# Patient Record
Sex: FEMALE | Race: OTHER | Employment: UNEMPLOYED | URBAN - METROPOLITAN AREA
[De-identification: names, ages, dates, MRNs, and addresses within clinical notes are randomized per-mention and may not be internally consistent; named-entity substitution may affect disease eponyms.]

---

## 2017-12-08 ENCOUNTER — OFFICE VISIT (OUTPATIENT)
Dept: FAMILY MEDICINE CLINIC | Age: 22
End: 2017-12-08

## 2017-12-08 VITALS
BODY MASS INDEX: 34.86 KG/M2 | WEIGHT: 230 LBS | SYSTOLIC BLOOD PRESSURE: 137 MMHG | DIASTOLIC BLOOD PRESSURE: 83 MMHG | HEIGHT: 68 IN | OXYGEN SATURATION: 97 % | TEMPERATURE: 98.4 F | HEART RATE: 78 BPM | RESPIRATION RATE: 16 BRPM

## 2017-12-08 DIAGNOSIS — Z23 ENCOUNTER FOR IMMUNIZATION: ICD-10-CM

## 2017-12-08 DIAGNOSIS — Z34.90 PREGNANCY, UNSPECIFIED GESTATIONAL AGE: Primary | ICD-10-CM

## 2017-12-08 DIAGNOSIS — N92.6 MISSED MENSES: ICD-10-CM

## 2017-12-08 DIAGNOSIS — R03.0 ELEVATED BLOOD PRESSURE READING: ICD-10-CM

## 2017-12-08 LAB
HCG URINE, QL. (POC): POSITIVE
VALID INTERNAL CONTROL?: YES

## 2017-12-08 NOTE — MR AVS SNAPSHOT
Visit Information Date & Time Provider Department Dept. Phone Encounter #  
 12/8/2017  2:15 PM Concepcion Vogt MD [facility] [phone] [id number] Follow-up Instructions Return Call in 2 weeks to schedule an appointment in early January. Upcoming Health Maintenance Date Due Hepatitis A Peds Age 1-18 (1 of 2 - Standard Series) 10/6/1998 DTaP/Tdap/Td series (1 - Tdap) 10/6/2004 HPV AGE 9Y-26Y (1 of 3 - Female 3 Dose Series) 10/6/2008 Influenza Age 5 to Adult 8/1/2017 Allergies as of 12/8/2017  Review Complete On: 9/26/2013 By: Lisa Fulton No Known Allergies Current Immunizations  Never Reviewed Name Date Influenza Vaccine (Quad) PF  Incomplete Not reviewed this visit You Were Diagnosed With   
  
 Codes Comments Pregnancy, unspecified gestational age    -  Primary ICD-10-CM: Z34.90 ICD-9-CM: V22.2 Missed menses     ICD-10-CM: N92.6 ICD-9-CM: 626.4 Encounter for immunization     ICD-10-CM: O28 ICD-9-CM: V03.89 Vitals BP Pulse Temp Resp Height(growth percentile) Weight(growth percentile) 137/83 (BP 1 Location: Left arm, BP Patient Position: Sitting) 78 98.4 °F (36.9 °C) (Oral) 16 5' 7.5\" (1.715 m) 230 lb (104.3 kg) LMP SpO2 BMI OB Status Smoking Status 11/06/2017 97% 35.49 kg/m2 Having regular periods Never Smoker Vitals History BMI and BSA Data Body Mass Index Body Surface Area  
 35.49 kg/m 2 2.23 m 2 Preferred Pharmacy Pharmacy Name Phone [id number] [address] [address] [phone] Your Updated Medication List  
  
   
This list is accurate as of: 12/8/17  2:34 PM.  Always use your most recent med list.  
  
  
  
  
 prenatal vit-calcium-iron-fa 27 mg iron- 1 mg Tab Commonly known as:  PRENATAL PLUS with CALCIUM Take 1 Tab by mouth daily. promethazine 25 mg tablet Commonly known as:  PHENERGAN Take 1 Tab by mouth every six (6) hours as needed for Nausea. Prescriptions Sent to Pharmacy Refills  
 prenatal vit-calcium-iron-fa (PRENATAL PLUS WITH CALCIUM) 27 mg iron- 1 mg tab 4 Sig: Take 1 Tab by mouth daily. Class: Normal  
 Pharmacy: [id number] [place], [place] - [address] [address] AT [address] Ph #: [phone] Route: Oral  
  
We Performed the Following AMB POC URINE PREGNANCY TEST, VISUAL COLOR COMPARISON [25437 CPT(R)] INFLUENZA VIRUS VAC QUAD,SPLIT,PRESV FREE SYRINGE IM [id number] CPT(R)] [place] INFLUENZA IMMUNIZATION ADMINISTERED OR PREVIOUSLY RECEIVED [4274F CPT(R)] Follow-up Instructions Return Call in 2 weeks to schedule an appointment in early January. Patient Instructions Nutrition During Pregnancy: Care Instructions Your Care Instructions Healthy eating when you are pregnant is important for you and your baby. It can help you feel well and have a successful pregnancy and delivery. During pregnancy your nutrition needs increase. Even if you have excellent eating habits, your doctor may recommend a multivitamin to make sure you get enough iron and folic acid. Many pregnant women wonder how much weight they should gain. In general, women who were at a healthy weight before they became pregnant should gain between 25 and 35 pounds. Women who were overweight before pregnancy are usually advised to gain 15 to 25 pounds. Women who were underweight before pregnancy are usually advised to gain 28 to 40 pounds. Your doctor will work with you to set a weight goal that is right for you. Gaining a healthy amount of weight helps you have a healthy baby. Follow-up care is a key part of your treatment and safety. Be sure to make and go to all appointments, and call your doctor if you are having problems. It's also a good idea to know your test results and keep a list of the medicines you take. How can you care for yourself at home? · Eat plenty of fruits and vegetables. Include a variety of orange, yellow, and leafy dark-green vegetables every day. · Choose whole-grain bread, cereal, and pasta. Good choices include whole wheat bread, whole wheat pasta, brown rice, and oatmeal. 
· Get 4 or more servings of milk and milk products each day. Good choices include nonfat or low-fat milk, yogurt, and cheese. If you cannot eat milk products, you can get calcium from calcium-fortified products such as orange juice, soy milk, and tofu. Other non-milk sources of calcium include leafy green vegetables, such as broccoli, kale, mustard greens, turnip greens, bok vicky, and brussels sprouts. · If you eat meat, pick lower-fat types. Good choices include lean cuts of meat and chicken or turkey without the skin. · Do not eat shark, swordfish, reymundo mackerel, or tilefish. They have high levels of mercury, which is dangerous to your baby. You can eat up to 12 ounces a week of fish or shellfish that have low mercury levels. Good choices include shrimp, wild salmon, pollock, and catfish. Do not eat more than 6 ounces of tuna each week. · Heat lunch meats (such as turkey, ham, or bologna) to 165°F before you eat them. This reduces your risk of getting sick from a kind of bacteria that can be found in lunch meats. · Do not eat unpasteurized soft cheeses, such as brie, feta, fresh mozzarella, and blue cheese. They have a bacteria that could harm your baby. · Limit caffeine. If you drink coffee or tea, have no more than 1 cup a day. Caffeine is also found in lisa. · Do not drink any alcohol. No amount of alcohol has been found to be safe during pregnancy. · Do not diet or try to lose weight. For example, do not follow a low-carbohydrate diet. If you are overweight at the start of your pregnancy, your doctor will work with you to manage your weight gain. · Tell your doctor about all vitamins and supplements you take. When should you call for help? Watch closely for changes in your health, and be sure to contact your doctor if you have any problems. Where can you learn more? Go to http://jean-claude-rojelio.info/. Enter Y785 in the search box to learn more about \"Nutrition During Pregnancy: Care Instructions. \" Current as of: March 16, 2017 Content Version: 11.4 © 3444-1169 [employer]. Care instructions adapted under license by [employer] (which disclaims liability or warranty for this information). If you have questions about a medical condition or this instruction, always ask your healthcare professional. [place] [address] any warranty or liability for your use of this information. Learning About Prenatal Visits Your Care Instructions Regular prenatal visits are very important during any pregnancy. These quick office visits may seem simple and routine. But they can help you and your baby stay healthy. Your doctor is watching for problems that can only be found by regularly checking you and your baby. The visits also give you and your doctor time to build a good relationship. Many women have prenatal visits every 4 to 6 weeks until week 28 of pregnancy. Then the visits become more frequent. This is often every 2 to 3 weeks through week 36 of pregnancy. In the final month of pregnancy, you likely will see your doctor every week. You may have a different schedule if you have a medical problem or are a teen. At different times in your pregnancy, you will have exams and tests. Some are routine. Others are done only when there is a chance of a problem. Everything healthy you do for your body helps your growing baby. Rest when you need it. Eat well, drink plenty of water, and exercise regularly. What happens during a prenatal visit? · You will have blood pressure checks, along with urine tests.  You also may have blood tests. If you need to go to the bathroom while waiting for the doctor, tell the nurse. He or she will give you a sample cup so your urine can be tested. · You will be weighed and have your belly measured. · Your doctor may listen to your baby's heartbeat with a special stethoscope. · In your second trimester, your doctor will check your blood sugar (glucose tolerance test) for diabetes that can occur during pregnancy. This is gestational diabetes, which can harm your baby. · You will have tests to check for infections that could harm your . These include group B streptococcus and hepatitis B. 
· Your doctor may do ultrasounds to check for problems. This also checks your baby's position. An ultrasound uses sound waves to produce a picture of your baby. · You may have other tests at any time during your pregnancy. · Use your visits to discuss with your doctor any concerns you have. How can you care for yourself at home? · Get plenty of rest. 
· Exercise every day, if your doctor says it is okay. If you have not exercised in the past, start out slowly. Take many short walks each day. · Eat a balanced diet. Make sure your diet includes plenty of beans, peas, and leafy green vegetables. · Drink plenty of fluids, enough so that your urine is light yellow or clear like water. Drink water. Cut down on drinks with caffeine, such as coffee, tea, and cola. If you have kidney, heart, or liver disease and have to limit fluids, talk with your doctor before you increase the amount of fluids you drink. · Avoid tobacco smoke, alcohol and drugs, chemical fumes, paint fumes, and poisons. Do not smoke or use tobacco. If you need help quitting, talk to your doctor about stop-smoking programs and medicines. These can increase your chances of quitting for good. · Review all of your medicines with your doctor. Some of your routine medicines may need to be changed to protect your baby.  Do not stop or start taking any medicines without talking to your doctor first. 
Follow-up care is a key part of your treatment and safety. Be sure to make and go to all appointments, and call your doctor if you are having problems. It's also a good idea to know your test results and keep a list of the medicines you take. Where can you learn more? Go to http://jean-claude-rojelio.info/. Enter J502 in the search box to learn more about \"Learning About Prenatal Visits. \" Current as of: March 16, 2017 Content Version: 11.4 © 6135-5405 [employer]. Care instructions adapted under license by [employer]) (which disclaims liability or warranty for this information). If you have questions about a medical condition or this instruction, always ask your healthcare professional. [address] any warranty or liability for your use of this information. Introducing [facility]! [facility] introduces [employer] patient portal. Now you can access parts of your medical record, email your doctor's office, and request medication refills online. 1. In your internet browser, go to [email or web address] 2. Click on the First Time User? Click Here link in the Sign In box. You will see the New Member Sign Up page. 3. Enter your [employer] Access Code exactly as it appears below. You will not need to use this code after youve completed the sign-up process. If you do not sign up before the expiration date, you must request a new code. · [employer] Access Code: [email or web address] Expires: 3/8/2018  2:34 PM 
 
4. Enter the last four digits of your Social Security Number (xxxx) and Date of Birth (mm/dd/yyyy) as indicated and click Submit. You will be taken to the next sign-up page. 5. Create a [employer] ID. This will be your [employer] login ID and cannot be changed, so think of one that is secure and easy to remember. 6. Create a marshallindex password. You can change your password at any time. 7. Enter your Password Reset Question and Answer. This can be used at a later time if you forget your password. 8. Enter your e-mail address. You will receive e-mail notification when new information is available in [address] 9. Click Sign Up. You can now view and download portions of your medical record. 10. Click the Download Summary menu link to download a portable copy of your medical information. If you have questions, please visit the Frequently Asked Questions section of the marshallindex website. Remember, marshallindex is NOT to be used for urgent needs. For medical emergencies, dial 911. Now available from your iPhone and Android! Please provide this summary of care documentation to your next provider. Your primary care clinician is listed as NONE. If you have any questions after today's visit, please call [phone].

## 2017-12-08 NOTE — PATIENT INSTRUCTIONS
Nutrition During Pregnancy: Care Instructions  Your Care Instructions    Healthy eating when you are pregnant is important for you and your baby. It can help you feel well and have a successful pregnancy and delivery. During pregnancy your nutrition needs increase. Even if you have excellent eating habits, your doctor may recommend a multivitamin to make sure you get enough iron and folic acid. Many pregnant women wonder how much weight they should gain. In general, women who were at a healthy weight before they became pregnant should gain between 25 and 35 pounds. Women who were overweight before pregnancy are usually advised to gain 15 to 25 pounds. Women who were underweight before pregnancy are usually advised to gain 28 to 40 pounds. Your doctor will work with you to set a weight goal that is right for you. Gaining a healthy amount of weight helps you have a healthy baby. Follow-up care is a key part of your treatment and safety. Be sure to make and go to all appointments, and call your doctor if you are having problems. It's also a good idea to know your test results and keep a list of the medicines you take. How can you care for yourself at home? · Eat plenty of fruits and vegetables. Include a variety of orange, yellow, and leafy dark-green vegetables every day. · Choose whole-grain bread, cereal, and pasta. Good choices include whole wheat bread, whole wheat pasta, brown rice, and oatmeal.  · Get 4 or more servings of milk and milk products each day. Good choices include nonfat or low-fat milk, yogurt, and cheese. If you cannot eat milk products, you can get calcium from calcium-fortified products such as orange juice, soy milk, and tofu. Other non-milk sources of calcium include leafy green vegetables, such as broccoli, kale, mustard greens, turnip greens, bok vicky, and brussels sprouts. · If you eat meat, pick lower-fat types.  Good choices include lean cuts of meat and chicken or turkey without the skin. · Do not eat shark, swordfish, reymundo mackerel, or tilefish. They have high levels of mercury, which is dangerous to your baby. You can eat up to 12 ounces a week of fish or shellfish that have low mercury levels. Good choices include shrimp, wild salmon, pollock, and catfish. Do not eat more than 6 ounces of tuna each week. · Heat lunch meats (such as turkey, ham, or bologna) to 165°F before you eat them. This reduces your risk of getting sick from a kind of bacteria that can be found in lunch meats. · Do not eat unpasteurized soft cheeses, such as brie, feta, fresh mozzarella, and blue cheese. They have a bacteria that could harm your baby. · Limit caffeine. If you drink coffee or tea, have no more than 1 cup a day. Caffeine is also found in lisa. · Do not drink any alcohol. No amount of alcohol has been found to be safe during pregnancy. · Do not diet or try to lose weight. For example, do not follow a low-carbohydrate diet. If you are overweight at the start of your pregnancy, your doctor will work with you to manage your weight gain. · Tell your doctor about all vitamins and supplements you take. When should you call for help? Watch closely for changes in your health, and be sure to contact your doctor if you have any problems. Where can you learn more? Go to http://jean-claude-rojelio.info/. Enter Y785 in the search box to learn more about \"Nutrition During Pregnancy: Care Instructions. \"  Current as of: March 16, 2017  Content Version: 11.4  © 5734-1510 [employer]. Care instructions adapted under license by DNs[employer] (which disclaims liability or warranty for this information). If you have questions about a medical condition or this instruction, always ask your healthcare professional. [address] any warranty or liability for your use of this information.          Learning About Prenatal Visits  Your Care Instructions    Regular prenatal visits are very important during any pregnancy. These quick office visits may seem simple and routine. But they can help you and your baby stay healthy. Your doctor is watching for problems that can only be found by regularly checking you and your baby. The visits also give you and your doctor time to build a good relationship. Many women have prenatal visits every 4 to 6 weeks until week 28 of pregnancy. Then the visits become more frequent. This is often every 2 to 3 weeks through week 36 of pregnancy. In the final month of pregnancy, you likely will see your doctor every week. You may have a different schedule if you have a medical problem or are a teen. At different times in your pregnancy, you will have exams and tests. Some are routine. Others are done only when there is a chance of a problem. Everything healthy you do for your body helps your growing baby. Rest when you need it. Eat well, drink plenty of water, and exercise regularly. What happens during a prenatal visit? · You will have blood pressure checks, along with urine tests. You also may have blood tests. If you need to go to the bathroom while waiting for the doctor, tell the nurse. He or she will give you a sample cup so your urine can be tested. · You will be weighed and have your belly measured. · Your doctor may listen to your baby's heartbeat with a special stethoscope. · In your second trimester, your doctor will check your blood sugar (glucose tolerance test) for diabetes that can occur during pregnancy. This is gestational diabetes, which can harm your baby. · You will have tests to check for infections that could harm your . These include group B streptococcus and hepatitis B.  · Your doctor may do ultrasounds to check for problems. This also checks your baby's position. An ultrasound uses sound waves to produce a picture of your baby. · You may have other tests at any time during your pregnancy.   · Use your visits to discuss with your doctor any concerns you have. How can you care for yourself at home? · Get plenty of rest.  · Exercise every day, if your doctor says it is okay. If you have not exercised in the past, start out slowly. Take many short walks each day. · Eat a balanced diet. Make sure your diet includes plenty of beans, peas, and leafy green vegetables. · Drink plenty of fluids, enough so that your urine is light yellow or clear like water. Drink water. Cut down on drinks with caffeine, such as coffee, tea, and cola. If you have kidney, heart, or liver disease and have to limit fluids, talk with your doctor before you increase the amount of fluids you drink. · Avoid tobacco smoke, alcohol and drugs, chemical fumes, paint fumes, and poisons. Do not smoke or use tobacco. If you need help quitting, talk to your doctor about stop-smoking programs and medicines. These can increase your chances of quitting for good. · Review all of your medicines with your doctor. Some of your routine medicines may need to be changed to protect your baby. Do not stop or start taking any medicines without talking to your doctor first.  Follow-up care is a key part of your treatment and safety. Be sure to make and go to all appointments, and call your doctor if you are having problems. It's also a good idea to know your test results and keep a list of the medicines you take. Where can you learn more? Go to http://jean-claude-rojelio.info/. Enter J502 in the search box to learn more about \"Learning About Prenatal Visits. \"  Current as of: March 16, 2017  Content Version: 11.4  © 0895-2139 [employer]. Care instructions adapted under license by [employer] (which disclaims liability or warranty for this information).  If you have questions about a medical condition or this instruction, always ask your healthcare professional. [address] any warranty or liability for your use of this information.

## 2017-12-08 NOTE — PROGRESS NOTES
23year old G1 -- here to confirm pregnancy    Obesity in pregnancy    I reviewed with the resident the medical history and the resident's findings on the physical examination. I discussed with the resident the patient's diagnosis and concur with the plan.
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
## 2018-01-22 ENCOUNTER — ROUTINE PRENATAL (OUTPATIENT)
Dept: FAMILY MEDICINE CLINIC | Age: 23
End: 2018-01-22

## 2018-01-22 VITALS
DIASTOLIC BLOOD PRESSURE: 75 MMHG | SYSTOLIC BLOOD PRESSURE: 121 MMHG | WEIGHT: 244 LBS | OXYGEN SATURATION: 98 % | RESPIRATION RATE: 18 BRPM | HEIGHT: 68 IN | BODY MASS INDEX: 36.98 KG/M2 | HEART RATE: 96 BPM | TEMPERATURE: 98.6 F

## 2018-01-22 DIAGNOSIS — O20.9 VAGINAL BLEEDING IN PREGNANCY, FIRST TRIMESTER: ICD-10-CM

## 2018-01-22 DIAGNOSIS — Z3A.11 11 WEEKS GESTATION OF PREGNANCY: Primary | ICD-10-CM

## 2018-01-22 LAB
BILIRUB UR QL STRIP: NEGATIVE
GLUCOSE UR-MCNC: NEGATIVE MG/DL
KETONES P FAST UR STRIP-MCNC: NEGATIVE MG/DL
PH UR STRIP: 5.5 [PH] (ref 4.6–8)
PROT UR QL STRIP: NORMAL
SP GR UR STRIP: 1.03 (ref 1–1.03)
UA UROBILINOGEN AMB POC: NORMAL (ref 0.2–1)
URINALYSIS CLARITY POC: NORMAL
URINALYSIS COLOR POC: YELLOW
URINE BLOOD POC: NORMAL
URINE LEUKOCYTES POC: NORMAL
URINE NITRITES POC: NEGATIVE

## 2018-01-22 NOTE — PROGRESS NOTES
Chief Complaint   Patient presents with    Vaginal Bleeding     this morning, brown discharge when she wipes     1. Have you been to the ER, urgent care clinic since your last visit? Hospitalized since your last visit? No    2. Have you seen or consulted any other health care providers outside of the [address] [place] [address] since your last visit? Include any pap smears or colon screening.  No
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
## 2018-01-22 NOTE — MR AVS SNAPSHOT
[address] 
[phone] Patient: Annmarie Gonzalez MRN: [id number] :[id number] Visit Information Date & Time Provider Department Dept. Phone Encounter #  
 2018  9:05 AM Candace Bernice Galeazzi, MD [address] [facility] [phone] [id number] Upcoming Health Maintenance Date Due Hepatitis A Peds Age 1-18 (1 of 2 - Standard Series) 10/6/1998 DTaP/Tdap/Td series (1 - Tdap) 10/6/2004 HPV AGE 9Y-26Y (1 of 3 - Female 3 Dose Series) 10/6/2008 Allergies as of 2018  Review Complete On: 2018 By: Chu Flood MD  
 No Known Allergies Current Immunizations  Never Reviewed Name Date Influenza Vaccine (Quad) PF 2017 Not reviewed this visit You Were Diagnosed With   
  
 Codes Comments 11 weeks gestation of pregnancy    -  Primary ICD-10-CM: Z3A.11 
ICD-9-CM: V22.2 Vitals BP Pulse Temp Resp Height(growth percentile) Weight(growth percentile) 121/75 (BP 1 Location: Right arm, BP Patient Position: Sitting) 96 98.6 °F (37 °C) (Oral) 18 5' 7.5\" (1.715 m) 244 lb (110.7 kg) LMP SpO2 BMI OB Status Smoking Status 2017 98% 37.65 kg/m2 Pregnant Never Smoker Vitals History BMI and BSA Data Body Mass Index Body Surface Area  
 37.65 kg/m 2 2.3 m 2 Preferred Pharmacy Pharmacy Name Phone [id number] [place], [place] - [address] [address] [phone] Your Updated Medication List  
  
   
This list is accurate as of: 18  9:41 AM.  Always use your most recent med list.  
  
  
  
  
 prenatal vit-calcium-iron-fa 27 mg iron- 1 mg Tab Commonly known as:  PRENATAL PLUS with CALCIUM Take 1 Tab by mouth daily. promethazine 25 mg tablet Commonly known as:  PHENERGAN Take 1 Tab by mouth every six (6) hours as needed for Nausea. We Performed the Following AMB POC URINALYSIS DIP STICK AUTO W/O MICRO [35752 CPT(R)] Patient Instructions Weeks 10 to 14 of Your Pregnancy: Care Instructions Your Care Instructions By weeks 10 to 15 of your pregnancy, the placenta has formed inside your uterus. It is possible to hear your baby's heartbeat with a special ultrasound device. Your baby's eyes can and do move. The arms and legs can bend. This is a good time to think about testing for birth defects. There are two types of tests: screening and diagnostic. Screening tests show the chance that a baby has a certain birth defect. They can't tell you for sure that your baby has a problem. Diagnostic tests show if a baby has a certain birth defect. It's your choice whether to have these tests. You and your partner can talk to your doctor or midwife about birth defects tests. Follow-up care is a key part of your treatment and safety. Be sure to make and go to all appointments, and call your doctor if you are having problems. It's also a good idea to know your test results and keep a list of the medicines you take. How can you care for yourself at home? Decide about tests · You can have screening tests and diagnostic tests to check for birth defects. The decision to have a test for birth defects is personal. Think about your age, your chance of passing on a family disease, your need to know about any problems, and what you might do after you have the test results. ¨ Triple or quadruple (quad) blood tests. These screening tests can be done between 15 and 20 weeks of pregnancy. They check the amounts of three or four substances in your blood. The doctor looks at these test results, along with your age and other factors, to find out the chance that your baby may have certain problems. ¨ Amniocentesis. This diagnostic test is used to look for chromosomal problems in the baby's cells. It can be done between 15 and 20 weeks of pregnancy, usually around week 16. ¨ Nuchal translucency test. This test uses ultrasound to measure the thickness of the area at the back of the baby's neck. An increase in the thickness can be an early sign of Down syndrome. ¨ Chorionic villus sampling (CVS). This is a test that looks for certain genetic problems with your baby. The same genes that are in your baby are in the placenta. A small piece of the placenta is taken out and tested. This test is done when you are 10 to 13 weeks pregnant. Ease discomfort · Slow down and take naps when you feel tired. · If your emotions swing, talk to someone. Crying, anxiety, and concentration problems are common. · If your gums bleed, try a softer toothbrush. If your gums are puffy and bleed a lot, see your dentist. 
· If you feel dizzy: ¨ Get up slowly after sitting or lying down. ¨ Drink plenty of fluids. ¨ Eat small snacks to keep your blood sugar stable. ¨ Put your head between your legs as though you were tying your shoelaces. ¨ Lie down with your legs higher than your head. Use pillows to prop up your feet. · If you have a headache: 
¨ Lie down. ¨ Ask your partner or a good friend for a neck massage. ¨ Try cool cloths over your forehead or across the back of your neck. ¨ Use acetaminophen (Tylenol) for pain relief. Do not use nonsteroidal anti-inflammatory drugs (NSAIDs), such as ibuprofen (Advil, Motrin) or naproxen (Aleve), unless your doctor says it is okay. · If you have a nosebleed, pinch your nose gently, and hold it for a short while. To prevent nosebleeds, try massaging a small dab of petroleum jelly, such as Vaseline, in your nostrils. · If your nose is stuffed up, try saline (saltwater) nose sprays. Do not use decongestant sprays. Care for your breasts · Wear a bra that gives you good support. · Know that changes in your breasts are normal. 
¨ Your breasts may get larger and more tender. Tenderness usually gets better by 12 weeks. ¨ Your nipples may get darker and larger, and small bumps around your nipples may show more. ¨ The veins in your chest and breasts may show more. · Don't worry about \"toughening'\" your nipples. Breastfeeding will naturally do this. Where can you learn more? Go to http://jean-claude-rojelio.info/. Enter L573 in the search box to learn more about \"Weeks 10 to 14 of Your Pregnancy: Care Instructions. \" Current as of: March 16, 2017 Content Version: 11.4 © 8342-0289 "[employer]". Care instructions adapted under license by [employer] (which disclaims liability or warranty for this information). If you have questions about a medical condition or this instruction, always ask your healthcare professional. [address] any warranty or liability for your use of this information. Introducing [facility]! Gracie Will introduces [employer] patient portal. Now you can access parts of your medical record, email your doctor's office, and request medication refills online. 1. In your internet browser, go to [email or web address] 2. Click on the First Time User? Click Here link in the Sign In box. You will see the New Member Sign Up page. 3. Enter your [employer] Access Code exactly as it appears below. You will not need to use this code after youve completed the sign-up process. If you do not sign up before the expiration date, you must request a new code. · [employer] Access Code: [email or web address] Expires: 3/8/2018  2:34 PM 
 
4. Enter the last four digits of your Social Security Number (xxxx) and Date of Birth (mm/dd/yyyy) as indicated and click Submit. You will be taken to the next sign-up page. 5. Create a [employer] ID. This will be your [employer] login ID and cannot be changed, so think of one that is secure and easy to remember. 6. Create a [employer] password. You can change your password at any time. 7. Enter your Password Reset Question and Answer. This can be used at a later time if you forget your password. 8. Enter your e-mail address. You will receive e-mail notification when new information is available in [address] 9. Click Sign Up. You can now view and download portions of your medical record. 10. Click the Download Summary menu link to download a portable copy of your medical information. If you have questions, please visit the Frequently Asked Questions section of the [employer] website. Remember, [employer] is NOT to be used for urgent needs. For medical emergencies, dial 911. Now available from your iPhone and Android! Please provide this summary of care documentation to your next provider. Your primary care clinician is listed as NONE. If you have any questions after today's visit, please call [phone].

## 2018-01-22 NOTE — PATIENT INSTRUCTIONS
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
## 2020-07-02 ENCOUNTER — VIRTUAL VISIT (OUTPATIENT)
Dept: FAMILY MEDICINE CLINIC | Age: 25
End: 2020-07-02

## 2020-07-02 DIAGNOSIS — G56.01 CARPAL TUNNEL SYNDROME OF RIGHT WRIST: Primary | ICD-10-CM

## 2020-07-02 RX ORDER — ARM BRACE
EACH MISCELLANEOUS
Qty: 1 EACH | Refills: 0 | Status: SHIPPED | OUTPATIENT
Start: 2020-07-02 | End: 2020-09-14

## 2020-07-02 NOTE — PROGRESS NOTES
[address] Medicine Residency Attending Addendum:  Dr. Dionisio Cerna MD,  the patient and I were not physically present during this encounter. The resident and I are concurrently monitoring the patient care through appropriate telecommunication technology. I discussed the findings, assessment and plan with the resident and agree with the resident's findings and plan as documented in the resident's note.       Katy Uriarte MD

## 2020-07-02 NOTE — PROGRESS NOTES
Deepak White  27 y.o. female  1995  [address]  [place] [address]  [id number]   [address]:    Telemedicine Progress Note  Court MD Francia       Encounter Date and Time: July 2, 2020 at 1:20 PM    Consent:  She and/or the health care decision maker is aware that that she may receive a bill for this telephone service, depending on her insurance coverage, and has provided verbal consent to proceed: Yes    No chief complaint on file. History of Present Illness   Deepak White is a 27 y.o. female was evaluated by synchronous (real-time) audio-video technology from home, through the [employer] Patient Portal.    Patient is a 22yo F who presents for pain in right hand pain. Reports having R wrist pain and tingling that has been occurring for one month but worsened in the past week. Pain located in right wrist and radiates down to inner elbow and has associated tingling sensation in right index finger. Works at [employer] and notices that pain is worse when she has her hands flexed and using the computer mouse. Messaging the arm and shaking out the discomfort helps it. She can have 10 or more episodes per day, but it is always worse in the afternoon when she is busier at work. Denies any trauma to the hand. Denies any swelling. Review of Systems   Review of Systems   Constitutional: Negative for chills and fever. HENT: Negative for congestion. Eyes: Negative for redness. Respiratory: Negative for cough, sputum production, shortness of breath and wheezing. Cardiovascular: Negative for chest pain and palpitations. Gastrointestinal: Negative for abdominal pain, constipation, diarrhea, nausea and vomiting. Genitourinary: Negative for dysuria. Musculoskeletal: Negative for joint pain. Skin: Negative for rash. Neurological: Negative for dizziness. Psychiatric/Behavioral: Negative for depression.        Vitals/Objective:     General: alert, cooperative, no distress   Mental  status: mental status: alert, oriented to person, place, and time, normal mood, behavior, speech, dress, motor activity, and thought processes   Resp: resp: normal effort and no respiratory distress   Neuro: neuro: no gross deficits    Skin:      MSK: skin: normal coloration and turgor, no rashes, no suspicious skin lesions noted  no discoloration or lesions of concern on visible areas  Positive Phalen sign on the right    Due to this being a TeleHealth evaluation, many elements of the physical examination are unable to be assessed. Assessment and Plan:   Time-based coding, delete if not needed: I spent at least 15 minutes with this established patient, and >50% of the time was spent counseling and/or coordinating care regarding wrist pain    Assessment/Plan:    1. Carpal tunnel syndrome of right wrist: Patient is a 23yo F with one week of right wrist pain and paresthesias. Given hx of repetitive wrist movement for work and positive phalen test, carpal tunnel syndrome is most likely cause of symptoms. Will give trial of wrist brace/splint nightly and have patient use NSAIDs as needed for pain. Patient will follow up as needed for persistence or worsening of symptoms. Time spent in direct conversation with the patient to include medical condition(s) discussed, assessment and treatment plan:       We discussed the expected course, resolution and complications of the diagnosis(es) in detail. Medication risks, benefits, costs, interactions, and alternatives were discussed as indicated. I advised her to contact the office if her condition worsens, changes or fails to improve as anticipated. She expressed understanding with the diagnosis(es) and plan. Patient understands that this encounter was a temporary measure, and the importance of further follow up and examination was emphasized. Patient verbalized understanding.        Patient informed to follow up: as needed in Clinic. Electronically Signed: Elieser Stout MD     Providers location when delivering service (clinic, hospital, home): Home    CPT Codes 04439-91104 for Established Patients may apply to this Telehealth Visit. POS code: 18. Modifier GT      Pursuant to the emergency declaration under the [address] [place] [address] [facility] authority and the [employer] Act, this Virtual  Visit was conducted, with patient's consent, to reduce the patient's risk of exposure to COVID-19 and provide continuity of care for an established patient. Services were provided through a video synchronous discussion virtually to substitute for in-person clinic visit. History   Patients past medical, surgical and family histories were reviewed and updated. No past medical history on file. Past Surgical History:   Procedure Laterality Date    HX APPENDECTOMY       No family history on file.   Social History     Socioeconomic History    Marital status: SINGLE     Spouse name: Not on file    Number of children: Not on file    Years of education: Not on file    Highest education level: Not on file   Occupational History    Not on file   Social Needs    Financial resource strain: Not on file    Food insecurity     Worry: Not on file     Inability: Not on file    Transportation needs     Medical: Not on file     Non-medical: Not on file   Tobacco Use    Smoking status: Never Smoker    Smokeless tobacco: Never Used   Substance and Sexual Activity    Alcohol use: No    Drug use: No    Sexual activity: Yes     Partners: Male     Comment: boyfriend, not using protection   Lifestyle    Physical activity     Days per week: Not on file     Minutes per session: Not on file    Stress: Not on file   Relationships    Social connections     Talks on phone: Not on file     Gets together: Not on file     Attends Rastafarian service: Not on file     Active member of club or organization: Not on file     Attends meetings of clubs or organizations: Not on file     Relationship status: Not on file    Intimate partner violence     Fear of current or ex partner: Not on file     Emotionally abused: Not on file     Physically abused: Not on file     Forced sexual activity: Not on file   Other Topics Concern    Not on file   Social History Narrative    Not on file     There is no problem list on file for this patient. Current Medications/Allergies   Medications and Allergies reviewed:    Current Outpatient Medications   Medication Sig Dispense Refill    prenatal vit-calcium-iron-fa (PRENATAL PLUS WITH CALCIUM) 27 mg iron- 1 mg tab Take 1 Tab by mouth daily. 90 Tab 4    promethazine (PHENERGAN) 25 mg tablet Take 1 Tab by mouth every six (6) hours as needed for Nausea.  10 Tab 0     No Known Allergies

## 2020-08-11 ENCOUNTER — TELEPHONE (OUTPATIENT)
Dept: FAMILY MEDICINE CLINIC | Age: 25
End: 2020-08-11

## 2020-08-11 NOTE — TELEPHONE ENCOUNTER
----- Message from Anshu Antunez sent at 8/5/2020  8:06 AM EDT -----  Regarding: Dr Rangel Natanael: [phone]  Appointment not available    Caller's first and last name and relationship to patient (if not the patient):      Best contact number:[phone]      Preferred date and time:face to face visit      Scheduled appointment date and time:      Reason for appointment:ear pain      Details to clarify the request:      Anshu Antunez

## 2020-09-20 ENCOUNTER — HOSPITAL ENCOUNTER (OUTPATIENT)
Dept: PREADMISSION TESTING | Age: 25
Discharge: HOME | End: 2020-09-20
Payer: MEDICAID

## 2020-09-20 DIAGNOSIS — C92.50: ICD-10-CM

## 2020-09-20 PROCEDURE — 87635 SARS-COV-2 COVID-19 AMP PRB: CPT

## 2020-09-21 LAB — SARS-COV-2, COV2NT: NOT DETECTED

## 2020-09-23 ENCOUNTER — HOSPITAL ENCOUNTER (OUTPATIENT)
Age: 25
Setting detail: OUTPATIENT SURGERY
Discharge: HOME | End: 2020-09-23
Attending: INTERNAL MEDICINE | Admitting: INTERNAL MEDICINE
Payer: MEDICAID

## 2020-09-23 ENCOUNTER — ANESTHESIA EVENT (OUTPATIENT)
Dept: ENDOSCOPY | Age: 25
End: 2020-09-23
Payer: MEDICAID

## 2020-09-23 ENCOUNTER — ANESTHESIA (OUTPATIENT)
Dept: ENDOSCOPY | Age: 25
End: 2020-09-23
Payer: MEDICAID

## 2020-09-23 VITALS
SYSTOLIC BLOOD PRESSURE: 112 MMHG | WEIGHT: 262 LBS | TEMPERATURE: 97.6 F | RESPIRATION RATE: 18 BRPM | BODY MASS INDEX: 40.43 KG/M2 | HEART RATE: 65 BPM | OXYGEN SATURATION: 99 % | DIASTOLIC BLOOD PRESSURE: 52 MMHG

## 2020-09-23 LAB — HCG UR QL: NEGATIVE

## 2020-09-23 PROCEDURE — 74011250636 HC RX REV CODE- 250/636: Performed by: NURSE ANESTHETIST, CERTIFIED REGISTERED

## 2020-09-23 PROCEDURE — 76060000031 HC ANESTHESIA FIRST 0.5 HR: Performed by: INTERNAL MEDICINE

## 2020-09-23 PROCEDURE — 81025 URINE PREGNANCY TEST: CPT

## 2020-09-23 PROCEDURE — 76040000019: Performed by: INTERNAL MEDICINE

## 2020-09-23 RX ORDER — FENTANYL CITRATE 50 UG/ML
12.5-2 INJECTION, SOLUTION INTRAMUSCULAR; INTRAVENOUS
Status: DISCONTINUED | OUTPATIENT
Start: 2020-09-23 | End: 2020-09-23 | Stop reason: HOSPADM

## 2020-09-23 RX ORDER — EPINEPHRINE 0.1 MG/ML
1 INJECTION INTRACARDIAC; INTRAVENOUS
Status: DISCONTINUED | OUTPATIENT
Start: 2020-09-23 | End: 2020-09-23 | Stop reason: HOSPADM

## 2020-09-23 RX ORDER — PROPOFOL 10 MG/ML
INJECTION, EMULSION INTRAVENOUS AS NEEDED
Status: DISCONTINUED | OUTPATIENT
Start: 2020-09-23 | End: 2020-09-23 | Stop reason: HOSPADM

## 2020-09-23 RX ORDER — SODIUM CHLORIDE 0.9 % (FLUSH) 0.9 %
5-40 SYRINGE (ML) INJECTION EVERY 8 HOURS
Status: DISCONTINUED | OUTPATIENT
Start: 2020-09-23 | End: 2020-09-23 | Stop reason: HOSPADM

## 2020-09-23 RX ORDER — SODIUM CHLORIDE 9 MG/ML
75 INJECTION, SOLUTION INTRAVENOUS CONTINUOUS
Status: DISCONTINUED | OUTPATIENT
Start: 2020-09-23 | End: 2020-09-23 | Stop reason: HOSPADM

## 2020-09-23 RX ORDER — FLUMAZENIL 0.1 MG/ML
0.2 INJECTION INTRAVENOUS
Status: DISCONTINUED | OUTPATIENT
Start: 2020-09-23 | End: 2020-09-23 | Stop reason: HOSPADM

## 2020-09-23 RX ORDER — NALOXONE HYDROCHLORIDE 0.4 MG/ML
0.4 INJECTION, SOLUTION INTRAMUSCULAR; INTRAVENOUS; SUBCUTANEOUS
Status: DISCONTINUED | OUTPATIENT
Start: 2020-09-23 | End: 2020-09-23 | Stop reason: HOSPADM

## 2020-09-23 RX ORDER — SODIUM CHLORIDE 9 MG/ML
INJECTION, SOLUTION INTRAVENOUS
Status: DISCONTINUED | OUTPATIENT
Start: 2020-09-23 | End: 2020-09-23 | Stop reason: HOSPADM

## 2020-09-23 RX ORDER — MIDAZOLAM HYDROCHLORIDE 1 MG/ML
.25-5 INJECTION, SOLUTION INTRAMUSCULAR; INTRAVENOUS
Status: DISCONTINUED | OUTPATIENT
Start: 2020-09-23 | End: 2020-09-23 | Stop reason: HOSPADM

## 2020-09-23 RX ORDER — ATROPINE SULFATE 0.1 MG/ML
0.5 INJECTION INTRAVENOUS
Status: DISCONTINUED | OUTPATIENT
Start: 2020-09-23 | End: 2020-09-23 | Stop reason: HOSPADM

## 2020-09-23 RX ORDER — SODIUM CHLORIDE 0.9 % (FLUSH) 0.9 %
5-40 SYRINGE (ML) INJECTION AS NEEDED
Status: DISCONTINUED | OUTPATIENT
Start: 2020-09-23 | End: 2020-09-23 | Stop reason: HOSPADM

## 2020-09-23 RX ORDER — DEXTROMETHORPHAN/PSEUDOEPHED 2.5-7.5/.8
1.2 DROPS ORAL
Status: DISCONTINUED | OUTPATIENT
Start: 2020-09-23 | End: 2020-09-23 | Stop reason: HOSPADM

## 2020-09-23 RX ADMIN — SODIUM CHLORIDE: 900 INJECTION, SOLUTION INTRAVENOUS at 10:00

## 2020-09-23 RX ADMIN — PROPOFOL 50 MG: 10 INJECTION, EMULSION INTRAVENOUS at 10:24

## 2020-09-23 RX ADMIN — PROPOFOL 100 MG: 10 INJECTION, EMULSION INTRAVENOUS at 10:07

## 2020-09-23 RX ADMIN — PROPOFOL 50 MG: 10 INJECTION, EMULSION INTRAVENOUS at 10:17

## 2020-09-23 RX ADMIN — PROPOFOL 50 MG: 10 INJECTION, EMULSION INTRAVENOUS at 10:12

## 2020-09-23 RX ADMIN — PROPOFOL 50 MG: 10 INJECTION, EMULSION INTRAVENOUS at 10:20

## 2020-09-23 RX ADMIN — PROPOFOL 50 MG: 10 INJECTION, EMULSION INTRAVENOUS at 10:14

## 2020-09-23 RX ADMIN — PROPOFOL 50 MG: 10 INJECTION, EMULSION INTRAVENOUS at 10:23

## 2020-09-23 RX ADMIN — PROPOFOL 50 MG: 10 INJECTION, EMULSION INTRAVENOUS at 10:19

## 2020-09-23 RX ADMIN — PROPOFOL 50 MG: 10 INJECTION, EMULSION INTRAVENOUS at 10:08

## 2020-09-23 RX ADMIN — PROPOFOL 50 MG: 10 INJECTION, EMULSION INTRAVENOUS at 10:10

## 2020-09-23 NOTE — DISCHARGE INSTRUCTIONS
[address], [address] [place] [address]    COLON DISCHARGE INSTRUCTIONS    Jese Flores  [id number]  1995    Discomfort:  Redness at IV site- apply warm compress to area; if redness or soreness persist- contact your physician  There may be a slight amount of blood passed from the rectum  Gaseous discomfort- walking, belching will help relieve any discomfort  You may not operate a vehicle for 12 hours  You may not engage in an occupation involving machinery or appliances for rest of today  You may not drink alcoholic beverages for at least 12 hours  Avoid making any critical decisions for at least 24 hour  DIET:  You may resume your regular diet - however -  remember your colon is empty and a heavy meal will produce gas. Avoid these foods:  vegetables, fried / greasy foods, carbonated drinks     ACTIVITY:  You may  resume your normal daily activities it is recommended that you spend the remainder of the day resting -  avoid any strenuous activity. CALL M.D. ANY SIGN OF:   Increasing pain, nausea, vomiting  Abdominal distension (swelling)  New increased bleeding (oral or rectal)  Fever (chills)  Pain in chest area  Bloody discharge from nose or mouth  Shortness of breath      Follow-up Instructions:   Call Dr. Omar Hernández for any questions or problems at [phone]          ENDOSCOPY FINDINGS:   Your colonoscopy showed ***. Telephone # [phone]      Signed By: Ivanna Mcgowan MD     9/23/2020  10:27 AM       DISCHARGE SUMMARY from Nurse    The following personal items collected during your admission are returned to you:   Dental Appliance: Dental Appliances: None  Vision:    Hearing Aid:    Jewelry:    Clothing:    Other Valuables:    Valuables sent to safe:        Learning About Coronavirus (COVID-19)  Coronavirus (COVID-19): Overview  What is coronavirus (COVID-19)? The coronavirus disease (COVID-19) is caused by a virus.  It is an illness that was first found in [place], [place], in December 2019. It has since spread worldwide. The virus can cause fever, cough, and trouble breathing. In severe cases, it can cause pneumonia and make it hard to breathe without help. It can cause death. Coronaviruses are a large group of viruses. They cause the common cold. They also cause more serious illnesses like Middle East respiratory syndrome (MERS) and severe acute respiratory syndrome (SARS). COVID-19 is caused by a novel coronavirus. That means it's a new type that has not been seen in people before. This virus spreads person-to-person through droplets from coughing and sneezing. It can also spread when you are close to someone who is infected. And it can spread when you touch something that has the virus on it, such as a doorknob or a tabletop. What can you do to protect yourself from coronavirus (COVID-19)? The best way to protect yourself from getting sick is to:  · Avoid areas where there is an outbreak. · Avoid contact with people who may be infected. · Wash your hands often with soap or alcohol-based hand sanitizers. · Avoid crowds and try to stay at least 6 feet away from other people. · Wash your hands often, especially after you cough or sneeze. Use soap and water, and scrub for at least 20 seconds. If soap and water aren't available, use an alcohol-based hand . · Avoid touching your mouth, nose, and eyes. What can you do to avoid spreading the virus to others? To help avoid spreading the virus to others:  · Cover your mouth with a tissue when you cough or sneeze. Then throw the tissue in the trash. · Use a disinfectant to clean things that you touch often. · Stay home if you are sick or have been exposed to the virus. Don't go to school, work, or public areas. And don't use public transportation. · If you are sick:  ? Leave your home only if you need to get medical care. But call the doctor's office first so they know you're coming.  And wear a face mask, if you have one.  ? If you have a face mask, wear it whenever you're around other people. It can help stop the spread of the virus when you cough or sneeze. ? Clean and disinfect your home every day. Use household  and disinfectant wipes or sprays. Take special care to clean things that you grab with your hands. These include doorknobs, remote controls, phones, and handles on your refrigerator and microwave. And don't forget countertops, tabletops, bathrooms, and computer keyboards. When to call for help  Call 911 anytime you think you may need emergency care. For example, call if:  · You have severe trouble breathing. (You can't talk at all.)  · You have constant chest pain or pressure. · You are severely dizzy or lightheaded. · You are confused or can't think clearly. · Your face and lips have a blue color. · You pass out (lose consciousness) or are very hard to wake up. Call your doctor now if you develop symptoms such as:  · Shortness of breath. · Fever. · Cough. If you need to get care, call ahead to the doctor's office for instructions before you go. Make sure you wear a face mask, if you have one, to prevent exposing other people to the virus. Where can you get the latest information? The following health organizations are tracking and studying this virus. Their websites contain the most up-to-date information. Yoli Erik also learn what to do if you think you may have been exposed to the virus. · U.S. Centers for Disease Control and Prevention (CDC): The CDC provides updated news about the disease and travel advice. The website also tells you how to prevent the spread of infection. www.cdc.gov  · World Health Organization [facility]): WHO offers information about the virus outbreaks. WHO also has travel advice. www.who.int  Current as of: April 1, 2020               Content Version: 12.4  © 3204-1150 Healthwise, Incorporated.    Care instructions adapted under license by your healthcare professional. If you have questions about a medical condition or this instruction, always ask your healthcare professional. [place] [address] any warranty or liability for your use of this information.

## 2020-09-23 NOTE — ROUTINE PROCESS
Taryn Outlada 
1995 
[id number] Situation: 
Verbal report received from: Leeann 
Procedure: Procedure(s): 
COLONOSCOPY    awaiting 9/20 Background: 
 
Preoperative diagnosis: CONSTIPATION,RECTAL BLEED Postoperative diagnosis: Hemorrhoids :  Dr. Fernanda Sorto Assistant(s): Endoscopy RN-1: Sylvia Alonso RN Endoscopy RN-2: Latoya Perez RN Specimens: * No specimens in log * H. Pylori  no Assessment: 
Intra-procedure medications Anesthesia gave intra-procedure sedation and medications, see anesthesia flow sheet yes Intravenous fluids: NS@ [facility] Vital signs stable Abdominal assessment: round and soft Recommendation: 
Discharge patient per MD order. Family or Friend spouse Permission to share finding with family or friend yes

## 2020-09-23 NOTE — PROCEDURES
[address]  [address]      Colonoscopy Operative Report    Robin Guzman  [id number]  1995      Procedure Type:   Colonoscopy --diagnostic     Indications:    Hematochezia  No prior colonoscopy    Pre-operative Diagnosis: see indication above    Post-operative Diagnosis:  See findings below    :  Yannick Ladd MD    Surgical Assistant: None    Implants:  None    Referring Provider: None      Sedation:  See anesthesia note      Procedure Details:  After informed consent was obtained with all risks and benefits of procedure explained and preoperative exam completed, the patient was taken to the endoscopy suite and placed in the left lateral decubitus position. Upon sequential sedation as per above, a digital rectal exam was performed demonstrating external hemorrhoids. The Olympus videocolonoscope  was inserted in the rectum and carefully advanced to the cecum, which was identified by the ileocecal valve and appendiceal orifice, terminal ileum. The cecum was identified by the ileocecal valve and appendiceal orifice. The quality of preparation was good. The colonoscope was slowly withdrawn with careful evaluation between folds. Retroflexion in the rectum was completed . Findings:   Rectum: Small external hemorrhoids on retroflexion;  Sigmoid: normal  Descending Colon: normal  Transverse Colon: normal  Ascending Colon: normal  Cecum: normal  Terminal Ileum: normal      Specimen Removed:  none    Complications: None. EBL:  None. Impression:    normal colonic mucosa and terminal ileum. hemorrhoids external, Small in size. Likely source of recent bleeding.      Recommendations:   -High fiber diet.  -Follow up with me as needed (if constipation note well controlled)      Recommendation for next colonscopy at age 50 years    Signed By: Yannick Ladd MD     9/23/2020  10:28 AM

## 2020-09-23 NOTE — ANESTHESIA POSTPROCEDURE EVALUATION
Post-Anesthesia Evaluation and Assessment    Patient: Lakisha Prabhakar MRN: [id number]  SSN: [id number]    YOB: 1995  Age: 27 y.o. Sex: female      I have evaluated the patient and they are stable and ready for discharge from the PACU. Heart rate 65     Cardiovascular Function/Vital Signs  Visit Vitals  BP (!) 112/52   Pulse (!) 0   Temp 36.4 °C (97.6 °F)   Resp (!) 39   Wt 118.8 kg (262 lb)   SpO2 99%   Breastfeeding Unknown   BMI 40.43 kg/m²       Patient is status post MAC anesthesia for Procedure(s):  COLONOSCOPY    awaiting 9/20. Nausea/Vomiting: None    Postoperative hydration reviewed and adequate. Pain:  Pain Scale 1: Numeric (0 - 10) (09/23/20 1035)  Pain Intensity 1: 0 (09/23/20 1035)   Managed    Neurological Status: At baseline    Mental Status, Level of Consciousness: Alert and  oriented to person, place, and time    Pulmonary Status:   O2 Device: Room air (09/23/20 1035)   Adequate oxygenation and airway patent    Complications related to anesthesia: None    Post-anesthesia assessment completed. No concerns    Signed By: Dex Duong MD     September 23, 2020              Procedure(s):  COLONOSCOPY    awaiting 9/20. MAC    <BSHSIANPOST>    INITIAL Post-op Vital signs:   Vitals Value Taken Time   BP 0/0 9/23/2020 11:04 AM   Temp 36.4 °C (97.6 °F) 9/23/2020 10:35 AM   Pulse 0 9/23/2020 11:04 AM   Resp 0 9/23/2020 11:14 AM   SpO2 99 % 9/23/2020 11:01 AM   Vitals shown include unvalidated device data.

## 2020-09-23 NOTE — PERIOP NOTES
.Patient has been evaluated by anesthesia pre-procedure. Patient alert and oriented. Vital signs will not be charted by the Endoscopy nurse. All vitals, airway, and loc are monitored by anesthesia staff throughout procedure.      .Endoscope will be pre-cleaned at bedside immediately following procedure by [facility]

## 2020-09-23 NOTE — H&P
[address], [address] [facility]  [phone]        History and Physical     NAME: Domingo Raines   :  1995   MRN:  [id number]         Past Surgical History:   Procedure Laterality Date    HX APPENDECTOMY      HX  SECTION       History reviewed. No pertinent past medical history. Social History     Tobacco Use    Smoking status: Never Smoker    Smokeless tobacco: Never Used   Substance Use Topics    Alcohol use: No    Drug use: No     No Known Allergies  History reviewed. No pertinent family history. No current facility-administered medications for this encounter. PHYSICAL EXAM:  General: WD, WN. Alert, cooperative, no acute distress    HEENT: NC, Atraumatic. PERRLA, EOMI. Anicteric sclerae. Lungs:  CTA Bilaterally. No Wheezing/Rhonchi/Rales. Heart:  Regular  rhythm,  No murmur, No Rubs, No Gallops  Abdomen: Obese, Soft, Non distended, Non tender. +Bowel sounds, no HSM  Extremities: No c/c/e  Neurologic:  CN 2-12 gi, Alert and oriented X 3. No acute neurological distress   Psych:   Good insight. Not anxious nor agitated. Assessment:   · Rectal bleeding, constipation.  No prior colonoscopy    Plan:   · Endoscopic procedure: Colonoscopy  · Mac

## 2021-03-02 ENCOUNTER — TELEPHONE (OUTPATIENT)
Dept: FAMILY MEDICINE CLINIC | Age: 26
End: 2021-03-02

## 2021-03-02 NOTE — TELEPHONE ENCOUNTER
----- Message from Shanthi Munoz MD sent at 2/23/2021  4:00 PM EST -----  Regarding: VV with me  Please call and schedule VV followup for headaches with me in 2 weeks.  Thanks     Shanthi Munoz MD

## 2022-03-21 ENCOUNTER — TELEPHONE (OUTPATIENT)
Dept: FAMILY MEDICINE CLINIC | Age: 27
End: 2022-03-21

## 2022-03-21 NOTE — TELEPHONE ENCOUNTER
Called patient to reschedule appintomnet that was on 3/18/22. The days and times that I had available did not meet patient needs so she will call us back to reschedule.

## 2022-05-11 ENCOUNTER — OFFICE VISIT (OUTPATIENT)
Dept: FAMILY MEDICINE CLINIC | Age: 27
End: 2022-05-11
Payer: MEDICAID

## 2022-05-11 VITALS
RESPIRATION RATE: 16 BRPM | DIASTOLIC BLOOD PRESSURE: 80 MMHG | HEART RATE: 82 BPM | BODY MASS INDEX: 45.99 KG/M2 | HEIGHT: 67 IN | SYSTOLIC BLOOD PRESSURE: 130 MMHG | WEIGHT: 293 LBS

## 2022-05-11 DIAGNOSIS — Z00.00 ANNUAL PHYSICAL EXAM: Primary | ICD-10-CM

## 2022-05-11 DIAGNOSIS — Z11.59 NEED FOR HEPATITIS C SCREENING TEST: ICD-10-CM

## 2022-05-11 DIAGNOSIS — E66.01 CLASS 3 SEVERE OBESITY WITHOUT SERIOUS COMORBIDITY WITH BODY MASS INDEX (BMI) OF 45.0 TO 49.9 IN ADULT, UNSPECIFIED OBESITY TYPE (HCC): ICD-10-CM

## 2022-05-11 PROCEDURE — 99395 PREV VISIT EST AGE 18-39: CPT | Performed by: FAMILY MEDICINE

## 2022-05-11 RX ORDER — NORETHINDRONE ACETATE AND ETHINYL ESTRADIOL AND FERROUS FUMARATE 1MG-20(21)
KIT ORAL
COMMUNITY
Start: 2022-03-18 | End: 2022-10-25

## 2022-05-11 NOTE — PROGRESS NOTES
Kallie Toth  27 y.o. female  1995  OAC:[id number]  [facility]  Progress Note     Assessment and Plan:    1. Annual physical exam  Diet, exercise and safety discussed with patient. Diagnoses and plans were discussed with the patient. After visit summary given to the patient as well. - CBC W/O DIFF; Future  - METABOLIC PANEL, COMPREHENSIVE; Future  - HEMOGLOBIN A1C WITH EAG; Future  - LIPID PANEL; Future    2. Need for hepatitis C screening test  - HEPATITIS C AB; Future    3. Class 3 severe obesity without serious comorbidity with body mass index (BMI) of 45.0 to 49.9 in adult, unspecified obesity type (HCC)  - TSH 3RD GENERATION; Future  - REFERRAL TO NUTRITION      Given patient's concern for BP will have her come back in 2 weeks with BP log. We will review labs as well at the same visit. I have low concern for HTN at this time and explained to patient that wrist cuffs are not accurate very often. Ju Milton MD    Kallie Toth is a 27 y.o. female who had concerns including Complete Physical (patient is here today for a complete physical and is concerned with some high blood pressure readings. 178-150s/80.). Health maintenance:    Denies any acute complaints  Wants routine labs   Gets GYN care with OBGYN  Diet is not balanced and does not exercise  Wants referral dietitian     Patient described one instance when her BP was checked with wrist cuff at derm office and was high. Otherwise she does not check it. Last dental exam: up to date    The following sections were reviewed & updated as appropriate: PMH, PSH, FH, and SH. There is no problem list on file for this patient. Prior to Admission medications    Medication Sig Start Date End Date Taking?  Authorizing Provider   Junel FE 1/20, 28, 1 mg-20 mcg (21)/75 mg (7) tab TAKE 1 TABLET BY MOUTH EVERY DAY FOR 84 DAYS 3/18/22  Yes Provider, Historical          No Known Allergies      Smoker:   Social History     Tobacco Use   Smoking Status Never Smoker   Smokeless Tobacco Never Used     ETOH:   Social History     Substance and Sexual Activity   Alcohol Use No       FH:    History reviewed. No pertinent family history. Review of Systems   Constitutional: Negative for fever and malaise/fatigue. HENT: Negative for congestion. Respiratory: Negative for cough. Cardiovascular: Negative for palpitations and leg swelling. Gastrointestinal: Negative for abdominal pain, nausea and vomiting. Musculoskeletal: Negative for myalgias. Neurological: Negative for headaches. Psychiatric/Behavioral: Negative for depression. The patient does not have insomnia. Physical Exam:  Visit Vitals  /80 (BP 1 Location: Left arm, BP Patient Position: Sitting, BP Cuff Size: Adult long)   Pulse 82   Resp 16   Ht 5' 7\" (1.702 m)   Wt 294 lb (133.4 kg)   BMI 46.05 kg/m²       Physical Examination:  Physical Exam  General appearance - alert, well appearing, and in no distress, oriented to person, place, and time and normal appearing weight  Mental status -  normal mood, behavior, speech, dress, motor activity, and thought processes, no expressed suicidal or homicidal ideation, no hallucinations  Ears -external ear canals normal  Nose - normal and patent, no erythema, discharge or polyps  Mouth - mucous membranes moist, pharynx normal without lesions  Neck - supple, no significant adenopathy  Breast-sees GYN  Chest - normal chest excursion, normal inspiratory and expiratory function. Clear to ausculation bilaterally. Heart - normal rate, regular rhythm, normal S1, S2, no murmurs, rubs, clicks or gallops, no extremity edema  Abdomen- benign, no organomegaly or masses  GYN-sees GYN  Skin-no rashes or suspicious moles  Neuro normal speech, moves all extremities, normal gait  Musculoskeletal- grossly normal joint and motor function. d to person, place, and time and normal appearing weight

## 2022-05-11 NOTE — PROGRESS NOTES
Chief Complaint   Patient presents with    Complete Physical     patient is here today for a complete physical and is concerned with some high blood pressure readings. 178-150s/80. Visit Vitals  /84 (BP 1 Location: Left arm, BP Patient Position: Sitting, BP Cuff Size: Adult long)   Pulse 82   Resp 16   Ht 5' 7\" (1.702 m)   Wt 294 lb (133.4 kg)   BMI 46.05 kg/m²     Repeat bp was 130/80 L arm.

## 2022-05-12 LAB
ALBUMIN SERPL-MCNC: 4.4 G/DL (ref 3.9–5)
ALBUMIN/GLOB SERPL: 1.6 {RATIO} (ref 1.2–2.2)
ALP SERPL-CCNC: 85 IU/L (ref 44–121)
ALT SERPL-CCNC: 16 IU/L (ref 0–32)
AST SERPL-CCNC: 9 IU/L (ref 0–40)
BILIRUB SERPL-MCNC: <0.2 MG/DL (ref 0–1.2)
BUN SERPL-MCNC: 12 MG/DL (ref 6–20)
BUN/CREAT SERPL: 28 (ref 9–23)
CALCIUM SERPL-MCNC: 9.5 MG/DL (ref 8.7–10.2)
CHLORIDE SERPL-SCNC: 101 MMOL/L (ref 96–106)
CHOLEST SERPL-MCNC: 225 MG/DL (ref 100–199)
CO2 SERPL-SCNC: 22 MMOL/L (ref 20–29)
CREAT SERPL-MCNC: 0.43 MG/DL (ref 0.57–1)
EGFR: 139 ML/MIN/1.73
ERYTHROCYTE [DISTWIDTH] IN BLOOD BY AUTOMATED COUNT: 13 % (ref 11.7–15.4)
EST. AVERAGE GLUCOSE BLD GHB EST-MCNC: 103 MG/DL
GLOBULIN SER CALC-MCNC: 2.7 G/DL (ref 1.5–4.5)
GLUCOSE SERPL-MCNC: 81 MG/DL (ref 65–99)
HBA1C MFR BLD: 5.2 % (ref 4.8–5.6)
HCT VFR BLD AUTO: 44.4 % (ref 34–46.6)
HCV AB S/CO SERPL IA: <0.1 S/CO RATIO (ref 0–0.9)
HDLC SERPL-MCNC: 39 MG/DL
HGB BLD-MCNC: 14.9 G/DL (ref 11.1–15.9)
IMP & REVIEW OF LAB RESULTS: NORMAL
LDLC SERPL CALC-MCNC: 124 MG/DL (ref 0–99)
MCH RBC QN AUTO: 29.9 PG (ref 26.6–33)
MCHC RBC AUTO-ENTMCNC: 33.6 G/DL (ref 31.5–35.7)
MCV RBC AUTO: 89 FL (ref 79–97)
PLATELET # BLD AUTO: 320 X10E3/UL (ref 150–450)
POTASSIUM SERPL-SCNC: 5 MMOL/L (ref 3.5–5.2)
PROT SERPL-MCNC: 7.1 G/DL (ref 6–8.5)
RBC # BLD AUTO: 4.98 X10E6/UL (ref 3.77–5.28)
SODIUM SERPL-SCNC: 138 MMOL/L (ref 134–144)
TRIGL SERPL-MCNC: 353 MG/DL (ref 0–149)
TSH SERPL DL<=0.005 MIU/L-ACNC: 0.97 UIU/ML (ref 0.45–4.5)
VLDLC SERPL CALC-MCNC: 62 MG/DL (ref 5–40)
WBC # BLD AUTO: 7.3 X10E3/UL (ref 3.4–10.8)

## 2022-05-12 NOTE — PROGRESS NOTES
Aside from elevated lipids labs look okay. At this age patient can work on lifestyle changes and she is encouraged. RTC 5/26 will discuss ways of losing weight and improving lifestyle.

## 2022-06-13 ENCOUNTER — TELEPHONE (OUTPATIENT)
Dept: FAMILY MEDICINE CLINIC | Age: 27
End: 2022-06-13

## 2022-08-26 DIAGNOSIS — E66.01 CLASS 3 SEVERE OBESITY WITHOUT SERIOUS COMORBIDITY WITH BODY MASS INDEX (BMI) OF 45.0 TO 49.9 IN ADULT, UNSPECIFIED OBESITY TYPE (HCC): Primary | ICD-10-CM

## 2022-10-25 ENCOUNTER — OFFICE VISIT (OUTPATIENT)
Dept: FAMILY MEDICINE CLINIC | Age: 27
End: 2022-10-25
Payer: MEDICAID

## 2022-10-25 VITALS
DIASTOLIC BLOOD PRESSURE: 81 MMHG | BODY MASS INDEX: 38.69 KG/M2 | HEART RATE: 68 BPM | SYSTOLIC BLOOD PRESSURE: 118 MMHG | OXYGEN SATURATION: 97 % | TEMPERATURE: 97.8 F | WEIGHT: 247 LBS

## 2022-10-25 DIAGNOSIS — N92.6 MISSED MENSES: Primary | ICD-10-CM

## 2022-10-25 DIAGNOSIS — Z3A.01 LESS THAN 8 WEEKS GESTATION OF PREGNANCY: ICD-10-CM

## 2022-10-25 LAB
HCG URINE, QL. (POC): POSITIVE
VALID INTERNAL CONTROL?: YES

## 2022-10-25 PROCEDURE — 81025 URINE PREGNANCY TEST: CPT

## 2022-10-25 PROCEDURE — 90686 IIV4 VACC NO PRSV 0.5 ML IM: CPT

## 2022-10-25 PROCEDURE — 99213 OFFICE O/P EST LOW 20 MIN: CPT

## 2022-10-25 NOTE — PROGRESS NOTES
Subjective:   Kait Joaquin is a 24 y.o. female who is being seen today for possible pregnancy due to missed menses. Patient is excited about her positive pregnancy tests. She had done the first one on Saturday which resulted positive, she has done a total of 5 tests at home which all resulted positive. Patient denies nausea/vomiting. Patient had an IUD that was removed earlier this year. Patient was not using any contraceptive measures. Patient has had regular menstrual cycles every month after IUD. LMP 09/08/2022. Allergies- reviewed:   No Known Allergies      Medications- reviewed:   Current Outpatient Medications   Medication Sig    prenatal vit-iron fumarate-fa 27 mg iron- 0.8 mg tab tablet Take 1 Tablet by mouth daily. Indications: pregnancy     No current facility-administered medications for this visit.          Past Medical History- reviewed:  Past Medical History:   Diagnosis Date    Eczema          Past Surgical History- reviewed:   Past Surgical History:   Procedure Laterality Date    COLONOSCOPY N/A 9/23/2020    COLONOSCOPY    awaiting 9/20 performed by Uriah Rivas MD at [facility] ENDOSCOPY    HX APPENDECTOMY       [facility]           Social History- reviewed:  Social History     Socioeconomic History    Marital status: SINGLE     Spouse name: Not on file    Number of children: Not on file    Years of education: Not on file    Highest education level: Not on file   Occupational History    Not on file   Tobacco Use    Smoking status: Never    Smokeless tobacco: Never   Vaping Use    Vaping Use: Never used   Substance and Sexual Activity    Alcohol use: No    Drug use: No    Sexual activity: Yes     Partners: Male     Birth control/protection: Pill   Other Topics Concern    Not on file   Social History Narrative    Not on file     Social Determinants of Health     Financial Resource Strain: Not on file   Food Insecurity: Not on file   Transportation Needs: Not on file   Physical Activity: Not on file   Stress: Not on file   Social Connections: Not on file   Intimate Partner Violence: Not on file   Housing Stability: Not on file         Immunizations- reviewed:   Immunization History   Administered Date(s) Administered    COVID-19, PFIZER PURPLE top, DILUTE for use, (age 15 y+), IM, 30mcg/0.3mL 04/05/2021, 04/26/2021, 01/04/2022    DTaP 10/15/2001    HPV (9-valent) 10/23/2008, 03/13/2012    Influenza, FLUARIX, FLULAVAL, FLUZONE (age 10 mo+) AND AFLURIA, (age 1 y+), PF, 0.5mL 12/08/2017, 10/25/2022    Td 11/15/2007     Flu: Receiving today    ROS:  General: No fevers or chills  GI: No abdominal pain, nausea, vomiting  : No vaginal bleeding      Objective:   Visit Vitals  /81 (BP 1 Location: Right upper arm, BP Patient Position: Sitting, BP Cuff Size: Adult long)   Pulse 68   Temp 97.8 °F (36.6 °C) (Temporal)   Wt 247 lb (112 kg)   LMP 09/13/2022   SpO2 97%   BMI 38.69 kg/m²       Physical Exam:  GENERAL APPEARANCE: alert, well appearing, in no apparent distress, oriented to person, place and time  HEAD: normocephalic, atraumatic  LUNGS: clear to auscultation, no wheezes, rales or rhonchi, symmetric air entry  HEART: regular rate and rhythm, no murmurs  ABDOMEN: soft, nontender, nondistended, no abnormal masses, no epigastric pain    Labs:  Urine pregnancy test Positive  No results found for this or any previous visit (from the past 12 hour(s)). Assessment:       ICD-10-CM ICD-9-CM    1. Missed menses  N92.6 626.4 AMB POC URINE PREGNANCY TEST, VISUAL COLOR COMPARISON      2. Less than 8 weeks gestation of pregnancy  Z3A.01 V22.2 prenatal vit-iron fumarate-fa 27 mg iron- 0.8 mg tab tablet      INFLUENZA, FLUARIX, FLULAVAL, FLUZONE (AGE 6 MO+), AFLURIA(AGE 3Y+) IM, PF, 0.5 ML          Pregnancy at six weeks 5 days by LMP. DUONG: 06/15/2023.       Plan:   Return to clinic in 4 week(s) for initial prenatal visit  Prenatal vitamins      Orders Placed This Encounter    Influenza, [address], Truett Dubin (age 10 mo+), AFLURIA (age 3y+) IM, PF, 0.5 mL    AMB POC URINE PREGNANCY TEST, VISUAL COLOR COMPARISON    prenatal vit-iron fumarate-fa 27 mg iron- 0.8 mg tab tablet     Sig: Take 1 Tablet by mouth daily. Indications: pregnancy     Dispense:  30 Tablet     Refill:  3         I have discussed the diagnosis with the patient and the intended plan as seen in the above orders. The patient verbalized understanding of the treatment plan and agrees with the plan. The patient has received an after-visit summary and questions were answered concerning future plans. I have discussed medication side effects and warnings with the patient as well. Informed pt to return to the office or go to the ER if she experiences vaginal bleeding, vaginal discharge, leaking of fluid, pelvic cramping.     Patient discussed with Dr. Ashu Ashraf (Attending)    Naomi Malone MD    Family Medicine Resident (PGY-1)

## 2022-10-25 NOTE — PROGRESS NOTES
Chief Complaint   Patient presents with    Missed Menses     Visit Vitals  /81 (BP 1 Location: Right upper arm, BP Patient Position: Sitting, BP Cuff Size: Adult long)   Pulse 68   Temp 97.8 °F (36.6 °C) (Temporal)   Wt 247 lb (112 kg)   SpO2 97%   BMI 38.69 kg/m²

## 2023-08-31 ENCOUNTER — NURSE TRIAGE (OUTPATIENT)
Dept: OTHER | Facility: CLINIC | Age: 28
End: 2023-08-31

## 2023-08-31 ENCOUNTER — OFFICE VISIT (OUTPATIENT)
Age: 28
End: 2023-08-31
Payer: MEDICAID

## 2023-08-31 VITALS
HEART RATE: 75 BPM | WEIGHT: 293 LBS | DIASTOLIC BLOOD PRESSURE: 84 MMHG | TEMPERATURE: 98.2 F | OXYGEN SATURATION: 98 % | RESPIRATION RATE: 18 BRPM | BODY MASS INDEX: 45.99 KG/M2 | SYSTOLIC BLOOD PRESSURE: 131 MMHG | HEIGHT: 67 IN

## 2023-08-31 DIAGNOSIS — M54.41 ACUTE RIGHT-SIDED LOW BACK PAIN WITH RIGHT-SIDED SCIATICA: Primary | ICD-10-CM

## 2023-08-31 PROCEDURE — 99213 OFFICE O/P EST LOW 20 MIN: CPT | Performed by: FAMILY MEDICINE

## 2023-08-31 RX ORDER — CYCLOBENZAPRINE HCL 5 MG
5 TABLET ORAL
Qty: 10 TABLET | Refills: 0 | Status: SHIPPED | OUTPATIENT
Start: 2023-08-31 | End: 2023-09-10

## 2023-08-31 RX ORDER — IBUPROFEN 800 MG/1
800 TABLET ORAL EVERY 8 HOURS PRN
Qty: 60 TABLET | Refills: 0 | Status: SHIPPED | OUTPATIENT
Start: 2023-08-31

## 2023-08-31 ASSESSMENT — PATIENT HEALTH QUESTIONNAIRE - PHQ9
1. LITTLE INTEREST OR PLEASURE IN DOING THINGS: 0
SUM OF ALL RESPONSES TO PHQ9 QUESTIONS 1 & 2: 0
SUM OF ALL RESPONSES TO PHQ QUESTIONS 1-9: 0
2. FEELING DOWN, DEPRESSED OR HOPELESS: 0
SUM OF ALL RESPONSES TO PHQ QUESTIONS 1-9: 0

## 2023-08-31 NOTE — TELEPHONE ENCOUNTER
[facility] [place] [facility]    Received call from [address] at [facility] with [employer]. Subjective: Caller states \"sciatica pain with tingling\"     Current Symptoms: Started about 3 days ago with right leg. From top of buttocks all the way down to tip of toes will get tingling, numbness and if stands for too long will get pain  Today the tingling has been lasting longer and is more persistent. Onset: 3 days ago; gradual, intermittent, worsening    Associated Symptoms: NA    Pain Severity:     Temperature: denies     What has been tried: massage, advil    LMP: NA Pregnant: No    Recommended disposition: Go to [facility]    Care advice provided, patient verbalizes understanding; denies any other questions or concerns; instructed to call back for any new or worsening symptoms. Patient/Caller agrees with recommended disposition; writer provided warm transfer to [facility] for appointment scheduling    Attention Provider: Thank you for allowing me to participate in the care of your patient. The patient was connected to triage in response to information provided to the ECC/PSC. Please do not respond through this encounter as the response is not directed to a shared pool.     Reason for Disposition   Tingling (e.g., pins and needles) of the face, arm or leg on one side of the body, that is present now (Exceptions: Chronic or recurrent symptom lasting > 4 weeks; or tingling from known cause, such as: bumped elbow, carpal tunnel syndrome, pinched nerve, frostbite.)    Protocols used: Neurologic Deficit-ADULT-[place]

## 2024-04-18 ENCOUNTER — TELEMEDICINE (OUTPATIENT)
Age: 29
End: 2024-04-18
Payer: MEDICAID

## 2024-04-18 DIAGNOSIS — Z76.89 ENCOUNTER FOR WEIGHT MANAGEMENT: Primary | ICD-10-CM

## 2024-04-18 DIAGNOSIS — E66.01 MORBID OBESITY DUE TO EXCESS CALORIES (HCC): ICD-10-CM

## 2024-04-18 PROCEDURE — 99212 OFFICE O/P EST SF 10 MIN: CPT

## 2024-04-18 NOTE — PROGRESS NOTES
Geovanna Coker  28 y.o. female  1995  [address] [address]  [id number]   [facility]:    Telemedicine Progress Note  Beth Serrano MD       Encounter Date and Time: April 18, 2024 at 5:05 PM    Geovanna Coker, was evaluated through a synchronous (real-time) audio-video encounter. The patient (or guardian if applicable) is aware that this is a billable service, which includes applicable co-pays. This Virtual Visit was conducted with patient's (and/or legal guardian's) consent. Patient identification was verified, and a caregiver was present when appropriate.     Chief Complaint   Patient presents with    Weight Management     History of Present Illness   Geovanna Coker is a 28 y.o. female was evaluated by synchronous (real-time) audio-video technology from home, through a secure patient portal.    #weight loss desired:   - has been seen by a bariatric surgeon, though she is hesitant at this time to go ahead with the procedure. She wants to try dietary changes along with medications right now  - has been having classes with bariatric surgeon, has an appointment in one week   - dietary: increasing protein intake (chicken, eggs, tilapia)  - carbohydrates: morning: eggs, sausage and toast  Lunch: chicken, rice and vegetables  - is interested in Ozempic  - Googled symptoms and wonders if it is due to thyroid    Was previously on phentermine, stopped taking it due to pregnancy, found it to be expensive. Was on it for 2 months  - initially started on low dose, then increased dose to 37 mg   - d/c due to pregnancy     Vitals/Objective:     General: alert, cooperative, and no distress   Mental  status: mental status: normal mood, behavior, speech, dress, motor activity, and thought processes   Resp: Able to complete sentences without signs of respiratory distress   Neuro: Alert and Oriented   Skin: skin: no discoloration or lesions of concern on visible areas

## 2024-05-15 SDOH — ECONOMIC STABILITY: FOOD INSECURITY: WITHIN THE PAST 12 MONTHS, THE FOOD YOU BOUGHT JUST DIDN'T LAST AND YOU DIDN'T HAVE MONEY TO GET MORE.: PATIENT DECLINED

## 2024-05-15 SDOH — ECONOMIC STABILITY: HOUSING INSECURITY
IN THE LAST 12 MONTHS, WAS THERE A TIME WHEN YOU DID NOT HAVE A STEADY PLACE TO SLEEP OR SLEPT IN A SHELTER (INCLUDING NOW)?: PATIENT DECLINED

## 2024-05-15 SDOH — ECONOMIC STABILITY: INCOME INSECURITY: HOW HARD IS IT FOR YOU TO PAY FOR THE VERY BASICS LIKE FOOD, HOUSING, MEDICAL CARE, AND HEATING?: PATIENT DECLINED

## 2024-05-15 SDOH — ECONOMIC STABILITY: TRANSPORTATION INSECURITY
IN THE PAST 12 MONTHS, HAS LACK OF TRANSPORTATION KEPT YOU FROM MEETINGS, WORK, OR FROM GETTING THINGS NEEDED FOR DAILY LIVING?: NO

## 2024-05-15 SDOH — ECONOMIC STABILITY: FOOD INSECURITY: WITHIN THE PAST 12 MONTHS, YOU WORRIED THAT YOUR FOOD WOULD RUN OUT BEFORE YOU GOT MONEY TO BUY MORE.: PATIENT DECLINED

## 2024-05-16 ENCOUNTER — OFFICE VISIT (OUTPATIENT)
Age: 29
End: 2024-05-16
Payer: MEDICAID

## 2024-05-16 VITALS
DIASTOLIC BLOOD PRESSURE: 75 MMHG | BODY MASS INDEX: 45.99 KG/M2 | RESPIRATION RATE: 18 BRPM | HEIGHT: 67 IN | TEMPERATURE: 98.2 F | WEIGHT: 293 LBS | SYSTOLIC BLOOD PRESSURE: 129 MMHG | HEART RATE: 77 BPM | OXYGEN SATURATION: 98 %

## 2024-05-16 DIAGNOSIS — E66.01 OBESITY, CLASS III, BMI 40-49.9 (MORBID OBESITY) (HCC): ICD-10-CM

## 2024-05-16 DIAGNOSIS — Z76.89 ENCOUNTER FOR WEIGHT MANAGEMENT: Primary | ICD-10-CM

## 2024-05-16 PROCEDURE — 93005 ELECTROCARDIOGRAM TRACING: CPT

## 2024-05-16 PROCEDURE — 99213 OFFICE O/P EST LOW 20 MIN: CPT

## 2024-05-16 NOTE — PROGRESS NOTES
Geovanna Coker is a 28 y.o. female      Chief Complaint   Patient presents with    Weight Management     Patient is coming in for a follow up on weight. Patient states she needs an ECG. No other concerns.        \"Have you been to the ER, urgent care clinic since your last visit?  Hospitalized since your last visit?\"    NO    “Have you seen or consulted any other health care providers outside of [facility] since your last visit?”    NO              Vitals:    05/16/24 1534   BP: 129/75   Site: Right Upper Arm   Position: Sitting   Pulse: 77   Resp: 18   Temp: 98.2 °F (36.8 °C)   TempSrc: Oral   SpO2: 98%   Weight: 133.8 kg (295 lb)   Height: 1.702 m (5' 7\")            Health Maintenance Due   Topic Date Due    Hepatitis B vaccine (1 of 3 - 3-dose series) Never done    Varicella vaccine (1 of 2 - 2-dose childhood series) Never done    DTaP/Tdap/Td vaccine (3 - Tdap) 10/06/2008    HIV screen  Never done    Pap smear  Never done    COVID-19 Vaccine (4 - 2023-24 season) 09/01/2023         Medication Reconciliation completed, changes noted.  Please  Update medication list.

## 2024-05-16 NOTE — PATIENT INSTRUCTIONS
One tablet once daily in the morning for 1 week; increase as tolerated in weekly intervals: 1 tablet twice daily for 1 week; then 2 tablets in the morning and 1 tablet in the evening for 1 week; and then 2 tablets twice daily (maximum dose: 4 tablets/day [naltrexone 32 mg/bupropion 360 mg per day]

## 2024-05-16 NOTE — PROGRESS NOTES
[address] [place] [address]   Office [phone], Fax [phone]    Chief Complaint     Chief Complaint   Patient presents with    Weight Management     Patient is coming in for a follow up on weight. Patient states she needs an ECG. No other concerns.        Subjective   Geovanna Coker is a 28 y.o. female who presents for:    #weight management:   - pt has seen a bariatric surgeon regarding getting a bariatric procedure done, but she wants to try lifestyle changes along with medications first   - diet: has been trying to eat more protein and limiting the carbs   - has tried phentermine in the past, though per pt's report it was way too expensive.      Past Medical History  Past Medical History:   Diagnosis Date    Eczema        Current Medications  Current Outpatient Medications   Medication Sig Dispense Refill    naltrexone-buPROPion (CONTRAVE) 8-90 MG per extended release tablet Take 2 tablets by mouth 2 times daily 120 tablet 0    ibuprofen (ADVIL;MOTRIN) 800 MG tablet Take 1 tablet by mouth every 8 hours as needed for Pain 60 tablet 0     No current facility-administered medications for this visit.         Objective   Vital Signs  Vitals:    05/16/24 1534   BP: 129/75   Pulse: 77   Resp: 18   Temp: 98.2 °F (36.8 °C)   SpO2: 98%       Physical Examination  Physical Exam  Vitals reviewed.   Constitutional:       General: She is not in acute distress.     Appearance: Normal appearance. She is obese. She is not ill-appearing.   HENT:      Head: Normocephalic and atraumatic.   Cardiovascular:      Rate and Rhythm: Normal rate and regular rhythm.   Pulmonary:      Effort: Pulmonary effort is normal. No respiratory distress.   Abdominal:      General: Abdomen is flat.   Neurological:      General: No focal deficit present.      Mental Status: She is alert and oriented to person, place, and time.   Psychiatric:         Mood and Affect: Mood normal.         Behavior: Behavior normal.

## 2024-05-28 ENCOUNTER — PATIENT MESSAGE (OUTPATIENT)
Age: 29
End: 2024-05-28

## 2024-05-29 NOTE — PROGRESS NOTES
[address] [place] [address]   Office [phone], Fax [phone]    Chief Complaint     Chief Complaint   Patient presents with    Weight Loss       Subjective   Geovanna Coker is a 28 y.o. female who presents for    #weight loss desired: was prescribed Contrave which required a prior auth and was not covered by insurance  - sexually active, using condoms  - diet: trying to eat dinner early (before 7 PM),   - trying to drink more water(using her Ochoa cup which she finds easier to drink from),   - eats protein with every meal, mozzarella sticks, protein yogurt (80 calories),   - has reduced amount of carbs (mexican - was doing a lot of rice/tortillas previously). Doing beans instead  - avoiding oily food, using avocado oil for cooking   - has cravings after dinner for something sweet- eats green apples with peanut butter  - exercise: since it is warm, started walking more with /kids    Past Medical History  Past Medical History:   Diagnosis Date    Eczema        Current Medications  Current Outpatient Medications   Medication Sig Dispense Refill    phentermine 15 MG capsule Take 1 capsule by mouth every morning for 30 days. Max Daily Amount: 15 mg 30 capsule 0     No current facility-administered medications for this visit.         Objective   Vital Signs  Vitals:    05/30/24 0931   BP: 135/79   Pulse: 77   Resp: 16   Temp: 97.7 °F (36.5 °C)   SpO2: 98%       Physical Examination  Physical Exam  Constitutional:       General: She is not in acute distress.     Appearance: Normal appearance. She is obese. She is not ill-appearing.   Cardiovascular:      Rate and Rhythm: Normal rate and regular rhythm.      Heart sounds: No murmur heard.  Pulmonary:      Effort: Pulmonary effort is normal. No respiratory distress.      Breath sounds: Normal breath sounds. No wheezing.   Abdominal:      General: Abdomen is flat.   Neurological:      General: No focal deficit present.      Mental

## 2024-05-30 ENCOUNTER — OFFICE VISIT (OUTPATIENT)
Age: 29
End: 2024-05-30
Payer: MEDICAID

## 2024-05-30 VITALS
HEART RATE: 77 BPM | BODY MASS INDEX: 45.99 KG/M2 | DIASTOLIC BLOOD PRESSURE: 79 MMHG | HEIGHT: 67 IN | TEMPERATURE: 97.7 F | SYSTOLIC BLOOD PRESSURE: 135 MMHG | RESPIRATION RATE: 16 BRPM | WEIGHT: 293 LBS | OXYGEN SATURATION: 98 %

## 2024-05-30 DIAGNOSIS — E66.01 MORBID OBESITY DUE TO EXCESS CALORIES (HCC): ICD-10-CM

## 2024-05-30 DIAGNOSIS — Z76.89 ENCOUNTER FOR WEIGHT MANAGEMENT: ICD-10-CM

## 2024-05-30 LAB
HCG, PREGNANCY, URINE, POC: NEGATIVE
VALID INTERNAL CONTROL, POC: YES

## 2024-05-30 PROCEDURE — 99213 OFFICE O/P EST LOW 20 MIN: CPT

## 2024-05-30 PROCEDURE — 81025 URINE PREGNANCY TEST: CPT

## 2024-05-30 RX ORDER — PHENTERMINE HYDROCHLORIDE 15 MG/1
15 CAPSULE ORAL EVERY MORNING
Qty: 30 CAPSULE | Refills: 0 | Status: SHIPPED | OUTPATIENT
Start: 2024-05-30 | End: 2024-06-29

## 2024-05-30 ASSESSMENT — PATIENT HEALTH QUESTIONNAIRE - PHQ9
SUM OF ALL RESPONSES TO PHQ QUESTIONS 1-9: 0
SUM OF ALL RESPONSES TO PHQ QUESTIONS 1-9: 0
2. FEELING DOWN, DEPRESSED OR HOPELESS: NOT AT ALL
1. LITTLE INTEREST OR PLEASURE IN DOING THINGS: NOT AT ALL
SUM OF ALL RESPONSES TO PHQ QUESTIONS 1-9: 0
SUM OF ALL RESPONSES TO PHQ QUESTIONS 1-9: 0
SUM OF ALL RESPONSES TO PHQ9 QUESTIONS 1 & 2: 0

## 2024-05-30 NOTE — PROGRESS NOTES
Room 20    Patient is accompanied by self. I have received verbal consent from Geovanna Coker to discuss any/all medical information while they are present in the room.    Identified pt with two pt identifiers(name and ). Reviewed record in preparation for visit and have obtained necessary documentation.  Chief Complaint   Patient presents with    Weight Loss        Here to discuss weight loss meds     Health Maintenance Due   Topic    Hepatitis B vaccine (1 of 3 - 3-dose series)    Varicella vaccine (1 of 2 - 2-dose childhood series)    DTaP/Tdap/Td vaccine (3 - Tdap)    HIV screen     Pap smear     COVID-19 Vaccine ( season)       Vitals:    24 0931   BP: 135/79   Site: Left Upper Arm   Position: Sitting   Cuff Size: Large Adult   Pulse: 77   Resp: 16   Temp: 97.7 °F (36.5 °C)   TempSrc: Temporal   SpO2: 98%   Weight: 135.2 kg (298 lb)   Height: 1.702 m (5' 7\")       Social Determinants Of Health:       SDOH screening not required at visit.  Resources Declined.   See AVS for attached resources, if requested.    Coordination of Care Questionnaire:       \"Have you been to the ER, urgent care clinic since your last visit?  Hospitalized since your last visit?\"    NO    “Have you seen or consulted any other health care providers outside of [facility] since your last visit?”    NO            Click Here for Release of Records Request

## 2024-05-31 LAB
AMPHETAMINES UR QL SCN: NEGATIVE NG/ML
BARBITURATES UR QL SCN: NEGATIVE NG/ML
BENZODIAZ UR QL: NEGATIVE NG/ML
BZE UR QL: NEGATIVE NG/ML
CANNABINOIDS UR QL SCN: NEGATIVE NG/ML
OPIATES UR QL: NEGATIVE NG/ML
PCP UR QL SCN: NEGATIVE NG/ML
SPECIMEN STATUS REPORT: NORMAL

## 2024-07-11 ENCOUNTER — TELEPHONE (OUTPATIENT)
Age: 29
End: 2024-07-11

## 2024-07-11 NOTE — TELEPHONE ENCOUNTER
Pt called and states she had skin TB test - done on 07/08/2024    [facility]    Born in [place] and this is first  TB one she's had   Aware to wear mask   [facility]    She just started yesterday and hasn't been around inmates yet

## 2024-07-12 ENCOUNTER — ANCILLARY PROCEDURE (OUTPATIENT)
Age: 29
End: 2024-07-12
Payer: MEDICAID

## 2024-07-12 ENCOUNTER — OFFICE VISIT (OUTPATIENT)
Age: 29
End: 2024-07-12
Payer: MEDICAID

## 2024-07-12 VITALS
HEIGHT: 67 IN | BODY MASS INDEX: 45.99 KG/M2 | RESPIRATION RATE: 18 BRPM | TEMPERATURE: 98 F | OXYGEN SATURATION: 97 % | HEART RATE: 84 BPM | SYSTOLIC BLOOD PRESSURE: 138 MMHG | WEIGHT: 293 LBS | DIASTOLIC BLOOD PRESSURE: 84 MMHG

## 2024-07-12 DIAGNOSIS — R76.11 PPD POSITIVE: Primary | ICD-10-CM

## 2024-07-12 DIAGNOSIS — R76.11 PPD POSITIVE: ICD-10-CM

## 2024-07-12 PROCEDURE — 71046 X-RAY EXAM CHEST 2 VIEWS: CPT

## 2024-07-12 PROCEDURE — 99214 OFFICE O/P EST MOD 30 MIN: CPT

## 2024-07-12 ASSESSMENT — PATIENT HEALTH QUESTIONNAIRE - PHQ9
2. FEELING DOWN, DEPRESSED OR HOPELESS: NOT AT ALL
SUM OF ALL RESPONSES TO PHQ QUESTIONS 1-9: 0
SUM OF ALL RESPONSES TO PHQ QUESTIONS 1-9: 0
SUM OF ALL RESPONSES TO PHQ9 QUESTIONS 1 & 2: 0
1. LITTLE INTEREST OR PLEASURE IN DOING THINGS: NOT AT ALL
SUM OF ALL RESPONSES TO PHQ QUESTIONS 1-9: 0
SUM OF ALL RESPONSES TO PHQ QUESTIONS 1-9: 0

## 2024-07-12 ASSESSMENT — ENCOUNTER SYMPTOMS
COUGH: 0
CHEST TIGHTNESS: 0
SHORTNESS OF BREATH: 0

## 2024-07-12 NOTE — PROGRESS NOTES
[facility]    Subjective   Geovanna Coker is a 28 y.o. 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 Her PPD test was positive for a 15 mm area of induration.     She denies any active symptoms including coughs/fevers/shortness of breath, night sweats/chills at this time.     Of note patient was born in [place] and has received the BCG vaccine, but she has never had a skin TB test performed before.       Review of Systems   Review of Systems   Constitutional:  Negative for chills, diaphoresis and fever.   Respiratory:  Negative for cough, chest tightness and shortness of breath.           Medical History  Past Medical History:   Diagnosis Date    Eczema        Medications  No current outpatient medications on file.     No current facility-administered medications for this visit.       Immunizations   Immunization History   Administered Date(s) Administered    Influenza, FLUARIX, FLULAVAL, FLUZONE (age 6 mo+) AND AFLURIA, (age 3 y+), PF, 0.5mL 12/08/2017, 10/25/2022       Allergies   No Known Allergies    Objective   Vital Signs  /84 (Site: Left Upper Arm, Position: Sitting, Cuff Size: Large Adult)   Pulse 84   Temp 98 °F (36.7 °C) (Temporal)   Resp 18   Ht 1.702 m (5' 7\")   Wt 135.2 kg (298 lb)   LMP 06/30/2024   SpO2 97%   BMI 46.67 kg/m²     Physical Examination  Physical Exam  Constitutional:       Appearance: Normal appearance.   HENT:      Head: Normocephalic and atraumatic.      Right Ear: External ear normal.      Left Ear: External ear normal.      Nose: Nose normal.   Eyes:      Extraocular Movements: Extraocular movements intact.   Cardiovascular:      Rate and Rhythm: Normal rate and regular rhythm.      Heart sounds: Normal heart sounds.   Pulmonary:      Effort: No respiratory distress.      Breath sounds: Normal breath sounds. No

## 2024-07-12 NOTE — PROGRESS NOTES
Room 19    Patient is accompanied by spouse. I have received verbal consent from Geovanna Coker to discuss any/all medical information while they are present in the room.    Identified pt with two pt identifiers(name and ). Reviewed record in preparation for visit and have obtained necessary documentation.  Chief Complaint   Patient presents with    PPD Reading      She had skin TB test - done on 2024    at [facility] area looked positive   15 mm measurement   Born in [place] and this is first  TB one she's had   [facility]    She just started yesterday and hasn't been around inmates yet   Needs chest xray and needs copy for shelter        Health Maintenance Due   Topic    Hepatitis B vaccine (1 of 3 - 3-dose series)    Varicella vaccine (1 of 2 - 2-dose childhood series)    DTaP/Tdap/Td vaccine (3 - Tdap)    HIV screen     Pap smear     COVID-19 Vaccine ( season)       Vitals:    24 1350   BP: 138/84   Site: Left Upper Arm   Position: Sitting   Cuff Size: Large Adult   Pulse: 84   Resp: 18   Temp: 98 °F (36.7 °C)   TempSrc: Temporal   SpO2: 97%   Weight: 135.2 kg (298 lb)   Height: 1.702 m (5' 7\")       Social Determinants Of Health:       SDOH screening not required at visit.  Resources Declined.   See AVS for attached resources, if requested.    Coordination of Care Questionnaire:       \"Have you been to the ER, urgent care clinic since your last visit?  Hospitalized since your last visit?\"    NO    “Have you seen or consulted any other health care providers outside of [facility] since your last visit?”    NO     “Have you had a pap smear?”    NO    No cervical cancer screening on file             Click Here for Release of Records Request

## 2024-07-16 ENCOUNTER — TELEPHONE (OUTPATIENT)
Age: 29
End: 2024-07-16

## 2024-07-16 NOTE — TELEPHONE ENCOUNTER
Pt stated that she left documents with you during her visit. She stated that she need the documents to return to work. She would like to know if she could  the completed documents, along with the xray results.    Thank you

## 2024-07-16 NOTE — TELEPHONE ENCOUNTER
----- Message from Maida Cabezas sent at 7/16/2024 12:17 PM EDT -----  Regarding: [facility] Results Request  [facility] Results Request    Which lab or imaging result is the patient calling about: Xray result    Which provider ordered the test? Dr. Lange    Was this a [facility] Provider: No    Date the test was preformed July 12, 2024  --------------------------------------------------------------------------------------------------------------------------    Relationship to Patient: Self     Call Back Info: OK to leave message on voicemail  Preferred Call Back Number: Phone [phone]

## 2025-03-06 ENCOUNTER — TELEPHONE (OUTPATIENT)
Age: 30
End: 2025-03-06

## 2025-03-06 NOTE — TELEPHONE ENCOUNTER
----- Message from Willie LINO sent at 3/6/2025  1:30 PM EST -----  Regarding: [facility] Appointment Request  [facility] Appointment Request    Patient needs appointment for [facility] Appointment Type: Existing Condition Follow Up.    Patient Requested Dates(s): As soon as possible  Patient Requested Time: any time  Provider Name: Lalit Ivey DO    Reason for Appointment Request: Established Patient - Available appointments did not meet patient need  --------------------------------------------------------------------------------------------------------------------------    Relationship to Patient: Self     Call Back Information: Do not leave any message, patient will call back for answer  Preferred Call Back Number: Phone [phone]

## 2025-03-07 NOTE — TELEPHONE ENCOUNTER
Attempted to contact pt to schedule appt; however, pt did not answer phone. No voice message was left, per pt's request.